# Patient Record
Sex: MALE | Race: BLACK OR AFRICAN AMERICAN | URBAN - METROPOLITAN AREA
[De-identification: names, ages, dates, MRNs, and addresses within clinical notes are randomized per-mention and may not be internally consistent; named-entity substitution may affect disease eponyms.]

---

## 2023-02-20 ENCOUNTER — EMERGENCY (EMERGENCY)
Facility: HOSPITAL | Age: 23
LOS: 0 days | Discharge: HOME | End: 2023-02-20
Attending: EMERGENCY MEDICINE
Payer: MEDICAID

## 2023-02-20 VITALS
OXYGEN SATURATION: 98 % | RESPIRATION RATE: 19 BRPM | HEART RATE: 66 BPM | TEMPERATURE: 99 F | SYSTOLIC BLOOD PRESSURE: 124 MMHG | DIASTOLIC BLOOD PRESSURE: 69 MMHG

## 2023-02-20 VITALS
DIASTOLIC BLOOD PRESSURE: 81 MMHG | HEART RATE: 53 BPM | WEIGHT: 139.33 LBS | SYSTOLIC BLOOD PRESSURE: 142 MMHG | OXYGEN SATURATION: 100 %

## 2023-02-20 DIAGNOSIS — K08.89 OTHER SPECIFIED DISORDERS OF TEETH AND SUPPORTING STRUCTURES: ICD-10-CM

## 2023-02-20 DIAGNOSIS — K02.9 DENTAL CARIES, UNSPECIFIED: ICD-10-CM

## 2023-02-20 DIAGNOSIS — K04.7 PERIAPICAL ABSCESS WITHOUT SINUS: ICD-10-CM

## 2023-02-20 PROCEDURE — 99284 EMERGENCY DEPT VISIT MOD MDM: CPT

## 2023-02-20 PROCEDURE — 41800 DRAINAGE OF GUM LESION: CPT | Mod: LT

## 2023-02-20 PROCEDURE — 70487 CT MAXILLOFACIAL W/DYE: CPT | Mod: MA

## 2023-02-20 PROCEDURE — 70487 CT MAXILLOFACIAL W/DYE: CPT | Mod: 26,MA

## 2023-02-20 PROCEDURE — 99284 EMERGENCY DEPT VISIT MOD MDM: CPT | Mod: 25

## 2023-02-20 RX ORDER — IBUPROFEN 200 MG
600 TABLET ORAL ONCE
Refills: 0 | Status: COMPLETED | OUTPATIENT
Start: 2023-02-20 | End: 2023-02-20

## 2023-02-20 RX ADMIN — Medication 1 TABLET(S): at 18:54

## 2023-02-20 RX ADMIN — Medication 600 MILLIGRAM(S): at 18:54

## 2023-02-20 NOTE — ED PROVIDER NOTE - OBJECTIVE STATEMENT
22 y M no pmhx utd co L dental abscess. pt was seen at  today and prescribed augmentin, preidex, and florastor for abscess formation. denies n/v/f/sob/cp/back pain/abd pain/ difficulty tolerating PO.

## 2023-02-20 NOTE — ED PROVIDER NOTE - PHYSICAL EXAMINATION
CONSTITUTIONAL: nontoxic appearing, in no acute distress  HEAD:  normocephalic, atraumatic. L cheek swelling w/ ttp over gums, no periapical abscess, no dental abnormalities   EYES:  no conjunctival injection, no eye discharge, tracking well  ENT:  tympanic membranes intact bilaterally, moist mucous membranes, no oropharyngeal ulcerations or lesions, no tonsillar swelling or erythema, no tonsillar exudates  NECK:  supple, no masses, no tender anterior/posterior cervical lymphadenopathy  CV:  regular rate and rhythm, cap refill < 2 seconds  RESP:  normal respiratory effort, lungs clear to auscultation bilaterally, no wheezes, no crackles, no retractions, no stridor  ABD:  soft, nontender, nondistended, no masses, no organomegaly  LYMPH:  no significant lymphadenopathy  MSK/NEURO:  normal movement, normal tone  SKIN:  warm, dry, no rash

## 2023-02-20 NOTE — ED PROVIDER NOTE - PATIENT PORTAL LINK FT
You can access the FollowMyHealth Patient Portal offered by [facility] by registering at the following website: [email or web address]. By joining [employer]’s FollowMyHealth portal, you will also be able to view your health information using other applications (apps) compatible with our system.

## 2023-02-20 NOTE — ED PROVIDER NOTE - CLINICAL SUMMARY MEDICAL DECISION MAKING FREE TEXT BOX
Authored by Dr. Silvana Da Silva: s/o to me by Dr. Soto    Imaging was ordered and reviewed by me.  Appropriate medications for patient's presenting complaints were ordered and effects were reassessed.  Patient's records (prior hospital, ED visit, and/or nursing home notes if available) were reviewed.  Additional history was obtained from EMS, family, and/or PCP (where available).  Escalation to admission/observation was considered.  However patient feels much better and is comfortable with discharge.  Appropriate follow-up was arranged.    22M p/w L facial swelling referred to ED by UC, ct omfs w/iv contrast was ordered by initial providers, s/o to me pending results & which showed odontogenic disease w/L facial cellulitis, dental consulted I&D performed @ bedside, rec abx & outpt f/u - all results d/w pt & copies given, strict return precautions discussed, rec outpt dental f/u

## 2023-02-20 NOTE — ED PROVIDER NOTE - NSFOLLOWUPINSTRUCTIONS_ED_ALL_ED_FT
Dental Abscess    Cross-sectional view of two teeth: one tooth is normal and the other tooth has an abscess.   A dental abscess is an area of pus in or around a tooth. It comes from an infection. It can cause pain and other symptoms. Treatment will help with symptoms and prevent the infection from spreading.      What are the causes?    This condition is caused by an infection in or around the tooth. This can be from:  •Very bad tooth decay (cavities).      •A bad injury to the tooth, such as a broken or chipped tooth.        What increases the risk?    The risk to get an abscess is higher in males. It is also more likely in people who:  •Have dental decay.      •Have very bad gum disease.      •Eat sugary snacks between meals.      •Use tobacco.      •Have diabetes.      •Have a weak disease-fighting system (immune system).      •Do not brush their teeth regularly.        What are the signs or symptoms?    Some mild symptoms are:  •Tenderness.      •Bad breath.      •Fever.      •A sharp, sour taste in the mouth.      •Pain in and around the infected tooth.      Worse symptoms of this condition include:  •Swollen neck glands.      •Chills.      •Pus draining around the tooth.      •Swelling and redness around the tooth, the mouth, or the face.      •Very bad pain in and around the tooth.      The worst symptoms can include:  •Difficulty swallowing.      •Difficulty opening your mouth.      •Feeling like you may vomit or vomiting.        How is this treated?    This is treated by getting rid of the infection. Your dentist will discuss ways to do this, including:  •Antibiotic medicines.      •Antibacterial mouth rinse.      •An incision in the abscess to drain out the pus.      •A root canal.      •Removing the tooth.        Follow these instructions at home:    Medicines     •Take over-the-counter and prescription medicines only as told by your dentist.      •If you were prescribed an antibiotic medicine, take it as told by your dentist. Do not stop taking it even if you start to feel better.      •If you were prescribed a gel that has numbing medicine in it, use it exactly as told.      •Ask your dentist if you should avoid driving or using machines while you are taking your medicine.      General instructions     •Rinse your mouth often with salt water. To make salt water, dissolve ½–1 tsp (3–6 g) of salt in 1 cup (237 mL) of warm water.      •Eat a soft diet while your mouth is healing.      •Drink enough fluid to keep your pee (urine) pale yellow.      • Do not apply heat to the outside of your mouth.      • Do not smoke or use any products that contain nicotine or tobacco. If you need help quitting, ask your dentist.      •Keep all follow-up visits.      Prevent an abscess     •Brush your teeth every morning and every night. Use fluoride toothpaste.      •Floss your teeth each day.      •Get dental cleanings as often as told by your dentist.      •Think about getting dental sealant put on teeth that have deep holes (decay).    •Drink water that has fluoride in it.  •Most tap water has fluoride.      •Check the label on bottled water to see if it has fluoride in it.        •Drink water instead of sugary drinks.       •Eat healthy meals and snacks.       •Wear a mouth guard or face shield when you play sports.        Contact a doctor if:    •Your pain is worse and medicine does not help.        Get help right away if:    •You have a fever or chills.      •Your symptoms suddenly get worse.      •You have a very bad headache.      •You have problems breathing or swallowing.      •You have trouble opening your mouth.      •You have swelling in your neck or close to your eye.      These symptoms may be an emergency. Get help right away. Call your local emergency services (911 in the [place]   • Do not wait to see if the symptoms will go away.       • Do not drive yourself to the hospital.         Summary    •A dental abscess is an area of pus in or around a tooth. It is caused by an infection.      •Treatment will help with symptoms and prevent the infection from spreading.      •Take over-the-counter and prescription medicines only as told by your dentist.      •To prevent an abscess, take good care of your teeth. Brush your teeth every morning and night. Use floss every day.       •Get dental cleanings as often as told by your dentist.      This information is not intended to replace advice given to you by your health care provider. Make sure you discuss any questions you have with your health care provider.

## 2023-02-20 NOTE — CONSULT NOTE ADULT - ASSESSMENT
Patient is a 22y old  Male who presents with a chief complaint of     HPI: Intraoral swelling that started two days ago      PAST MEDICAL & SURGICAL HISTORY:        MEDICATIONS  (STANDING):    MEDICATIONS  (PRN):      Allergies    No Known Allergies    Intolerances        FAMILY HISTORY:      Vital Signs Last 24 Hrs  T(C): 37.3 (20 Feb 2023 19:45), Max: 37.3 (20 Feb 2023 19:45)  T(F): 99.1 (20 Feb 2023 19:45), Max: 99.1 (20 Feb 2023 19:45)  HR: 66 (20 Feb 2023 19:45) (53 - 66)  BP: 124/69 (20 Feb 2023 19:45) (124/69 - 142/81)  BP(mean): --  RR: 19 (20 Feb 2023 19:45) (19 - 19)  SpO2: 98% (20 Feb 2023 19:45) (98% - 100%)    Parameters below as of 20 Feb 2023 19:45  Patient On (Oxygen Delivery Method): room air        LABS:                  EOE:  TMJ ( -  ) clicks                     ( -  ) pops                     ( -  ) crepitus             Mandible <<FROM>>             Facial bones and MOM <<grossly intact>>             ( -  ) trismus             ( -  ) lymphadenopathy             ( +  ) swelling             ( -  ) asymmetry             ( +  ) palpation             ( -  ) dyspnea             ( -  ) dysphagia             ( -  ) loss of consciousness    IOE:  <<permanent>> dentition: <<grossly intact>>            hard/soft palate:  <<No pathology noted>>           tongue/FOM <<No pathology noted>>           labial/buccal mucosa <<No pathology noted>>           ( -  ) percussion           ( +  ) palpation           ( +  ) swelling            ( +  ) abscess           ( -  ) sinus tract    Dentition present: <<y>>  Mobility: <<n>>  Caries: << y >>         *DENTAL RADIOGRAPHS:    RADIOLOGY & ADDITIONAL STUDIES:    *ASSESSMENT: Intraoral fluctuant swelling in buccal vestibule of #13,14 with noted caries.       *PLAN: Incision and drainage. Advised patient to seek comprehensive care with outpatient dentist.    PROCEDURE:   Verbal and written consent given.  Anesthesia: 2 carpules of 2% Lidocaine (1:100,000 epinephrine) via buccal infiltration  Treatment: A linear incision along the buccal vestibule was made and the purulent material expressed. The abscess was explored thoroughly and sequestered pockets were opened. Irrigated with copious amounts of normal saline. Bleeding was minimal. The patient tolerated the procedure well without complications. Standard post-procedure care is explained and return precautions are given. Recommendation for Augment 875 mg q12h x 7 days    RECOMMENDATIONS:  1) Complete antibiotic course as prescribed and take analgesics as necessary for the pain  2) Dental F/U with outpatient dentist for comprehensive dental care.   3) If any difficulty swallowing/breathing, fever occur, return to ER.     Resident Name: Juanpablo Marie, pager #[phone]

## 2023-02-20 NOTE — ED PROVIDER NOTE - ATTENDING CONTRIBUTION TO CARE
22 y.o. M with no PMH with no PMH with right sided dental pain and abscess. Well appearing, NAD, non toxic. NCAT PERRLA EOMI right sided right sided swelling with no drainage. neck supple non tender normal wob cta bl rrr abdomen s nt nd no rebound no guarding     CT dental labs

## 2023-02-20 NOTE — ED PROVIDER NOTE - PROGRESS NOTE DETAILS
Your blood work did not show any evidence of electrolyte abnormality or rhabdomyolysis  Follow up with your primary care physician for continued symptoms  Return to the emergency department for severe pain, numbness, seizures, vomiting, altered mental status, any other new or concerning symptoms  PS: Pt signed out to me by Dr. Douglas. Pending CT PS: Dental consulted PS: Abscess drained by dental. Will dc with augmentin x 1 week as per dental and outpatient follow up